# Patient Record
(demographics unavailable — no encounter records)

---

## 2025-06-18 NOTE — HISTORY OF PRESENT ILLNESS
[de-identified] : 06/18/2025: Patient is a 11yo female presenting with her Mom for evaluation of R middle finger fracture sustained 1 month ago after jamming her finger on a ball during a game. She went to  and had XR confirming fracture. She was advised to k[place] tape which she did for a week and then pain resolved. She recently hit the finger again and had some pain that developed again, but resolved quickly. She notes some mild stiffness with ROM. Denies weakness or paresthesias.

## 2025-06-18 NOTE — PHYSICAL EXAM
[de-identified] : R long finger: - Mild swelling of the PIP - Mild pain with ulnar aspect of volar PIP - Finger ROM intact throughout - 5/5 strength throughout - Distally neurovascularly intact [de-identified] : 3 views of the R hand showing long finger middle phalanx volar plate avulsion fracture which appears to spare the physis. No dislocation. No significant displacement

## 2025-06-18 NOTE — HISTORY OF PRESENT ILLNESS
[de-identified] : 06/18/2025: Patient is a 11yo female presenting with her Mom for evaluation of R middle finger fracture sustained 1 month ago after jamming her finger on a ball during a game. She went to  and had XR confirming fracture. She was advised to k[place] tape which she did for a week and then pain resolved. She recently hit the finger again and had some pain that developed again, but resolved quickly. She notes some mild stiffness with ROM. Denies weakness or paresthesias.

## 2025-06-18 NOTE — PHYSICAL EXAM
[de-identified] : R long finger: - Mild swelling of the PIP - Mild pain with ulnar aspect of volar PIP - Finger ROM intact throughout - 5/5 strength throughout - Distally neurovascularly intact [de-identified] : 3 views of the R hand showing long finger middle phalanx volar plate avulsion fracture which appears to spare the physis. No dislocation. No significant displacement

## 2025-06-18 NOTE — ASSESSMENT
[FreeTextEntry1] : - Advised to buddy tape with physical activities for the next 2 weeks - Can resume activities as tolerated - motrin, tylenol, ice as needed - Follow up as needed